# Patient Record
Sex: FEMALE | Race: WHITE | NOT HISPANIC OR LATINO | Employment: FULL TIME | URBAN - METROPOLITAN AREA
[De-identification: names, ages, dates, MRNs, and addresses within clinical notes are randomized per-mention and may not be internally consistent; named-entity substitution may affect disease eponyms.]

---

## 2020-08-04 PROBLEM — D25.9 FIBROID, UTERINE: Status: ACTIVE | Noted: 2020-08-04

## 2021-06-23 ENCOUNTER — EMPLOYEE HEALTH (OUTPATIENT)
Dept: OCCUPATIONAL MEDICINE | Facility: CLINIC | Age: 51
End: 2021-06-23

## 2021-06-23 ENCOUNTER — HOSPITAL ENCOUNTER (OUTPATIENT)
Facility: MEDICAL CENTER | Age: 51
End: 2021-06-23
Attending: NURSE PRACTITIONER
Payer: COMMERCIAL

## 2021-06-23 ENCOUNTER — EH NON-PROVIDER (OUTPATIENT)
Dept: OCCUPATIONAL MEDICINE | Facility: CLINIC | Age: 51
End: 2021-06-23

## 2021-06-23 VITALS
SYSTOLIC BLOOD PRESSURE: 124 MMHG | RESPIRATION RATE: 16 BRPM | BODY MASS INDEX: 26.5 KG/M2 | HEIGHT: 60 IN | DIASTOLIC BLOOD PRESSURE: 62 MMHG | WEIGHT: 135 LBS | OXYGEN SATURATION: 100 % | HEART RATE: 90 BPM

## 2021-06-23 DIAGNOSIS — Z02.89 ENCOUNTER FOR OCCUPATIONAL HEALTH ASSESSMENT: Primary | ICD-10-CM

## 2021-06-23 DIAGNOSIS — Z02.1 PRE-EMPLOYMENT HEALTH SCREENING EXAMINATION: ICD-10-CM

## 2021-06-23 DIAGNOSIS — Z02.89 ENCOUNTER FOR OCCUPATIONAL HEALTH ASSESSMENT: ICD-10-CM

## 2021-06-23 LAB — VZV IGG SER IA-ACNC: 2.12

## 2021-06-23 PROCEDURE — 86762 RUBELLA ANTIBODY: CPT | Performed by: NURSE PRACTITIONER

## 2021-06-23 PROCEDURE — 90715 TDAP VACCINE 7 YRS/> IM: CPT | Performed by: NURSE PRACTITIONER

## 2021-06-23 PROCEDURE — 90746 HEPB VACCINE 3 DOSE ADULT IM: CPT | Performed by: NURSE PRACTITIONER

## 2021-06-23 PROCEDURE — 86787 VARICELLA-ZOSTER ANTIBODY: CPT | Performed by: NURSE PRACTITIONER

## 2021-06-23 PROCEDURE — 8915 PR COMPREHENSIVE PHYSICAL: Performed by: NURSE PRACTITIONER

## 2021-06-23 PROCEDURE — 94010 BREATHING CAPACITY TEST: CPT | Performed by: NURSE PRACTITIONER

## 2021-06-23 PROCEDURE — 86765 RUBEOLA ANTIBODY: CPT | Performed by: NURSE PRACTITIONER

## 2021-06-23 PROCEDURE — 94375 RESPIRATORY FLOW VOLUME LOOP: CPT | Performed by: NURSE PRACTITIONER

## 2021-06-23 PROCEDURE — 86735 MUMPS ANTIBODY: CPT | Performed by: NURSE PRACTITIONER

## 2021-06-23 PROCEDURE — 86480 TB TEST CELL IMMUN MEASURE: CPT | Performed by: NURSE PRACTITIONER

## 2021-06-24 LAB
MEV IGG SER IA-ACNC: 0.76
MUV IGG SER IA-ACNC: 3.07
RUBV AB SER QL: 96.7 IU/ML

## 2021-06-25 LAB
GAMMA INTERFERON BACKGROUND BLD IA-ACNC: 0.04 IU/ML
M TB IFN-G BLD-IMP: NEGATIVE
M TB IFN-G CD4+ BCKGRND COR BLD-ACNC: -0.02 IU/ML
MITOGEN IGNF BCKGRD COR BLD-ACNC: 7.14 IU/ML
QFT TB2 - NIL TBQ2: -0.02 IU/ML

## 2021-08-25 ENCOUNTER — NON-PROVIDER VISIT (OUTPATIENT)
Dept: OCCUPATIONAL MEDICINE | Facility: CLINIC | Age: 51
End: 2021-08-25

## 2021-08-25 DIAGNOSIS — Z23 NEED FOR VACCINATION: ICD-10-CM

## 2021-08-25 PROCEDURE — 90746 HEPB VACCINE 3 DOSE ADULT IM: CPT | Performed by: NURSE PRACTITIONER

## 2021-09-13 ENCOUNTER — EH NON-PROVIDER (OUTPATIENT)
Dept: OCCUPATIONAL MEDICINE | Facility: CLINIC | Age: 51
End: 2021-09-13

## 2021-09-13 DIAGNOSIS — Z71.85 IMMUNIZATION COUNSELING: ICD-10-CM

## 2021-12-24 PROBLEM — F41.9 ANXIETY: Status: ACTIVE | Noted: 2021-12-24

## 2021-12-24 PROBLEM — E11.10 DKA, TYPE 2, NOT AT GOAL (HCC): Status: ACTIVE | Noted: 2021-12-24

## 2021-12-24 PROBLEM — Z79.4 TYPE 2 DIABETES MELLITUS WITH KETOACIDOSIS, WITH LONG-TERM CURRENT USE OF INSULIN (HCC): Status: ACTIVE | Noted: 2021-12-24

## 2021-12-24 PROBLEM — J45.30 MILD PERSISTENT ASTHMA WITHOUT COMPLICATION: Status: ACTIVE | Noted: 2021-12-24

## 2021-12-24 PROBLEM — E11.10 TYPE 2 DIABETES MELLITUS WITH KETOACIDOSIS, WITH LONG-TERM CURRENT USE OF INSULIN (HCC): Status: ACTIVE | Noted: 2021-12-24

## 2021-12-24 PROBLEM — F17.200 TOBACCO USE DISORDER: Status: ACTIVE | Noted: 2021-12-24

## 2022-01-03 ENCOUNTER — PATIENT OUTREACH (OUTPATIENT)
Dept: HEALTH INFORMATION MANAGEMENT | Facility: OTHER | Age: 52
End: 2022-01-03

## 2022-01-03 NOTE — LETTER
1/5/2022    Rebeca Kumar  [address] [address]  [place] [address]      Dear Lina Jose, my name is Dyana Gallegos R.N.. As your [facility] , I want to work with you to help improve your health and wellness, and address any health care needs you may have.    Unfortunately, I have been unable to reach you by phone.    Please call me at Dept: [phone]. If you are hearing impaired, please dial [phone].    I look forward to talking with you.    Sincerely,  Dyana Gallegos R.N.

## 2022-01-08 PROBLEM — E11.10 DKA, TYPE 2, NOT AT GOAL (HCC): Status: RESOLVED | Noted: 2021-12-24 | Resolved: 2022-01-08

## 2022-08-24 ENCOUNTER — OFFICE VISIT (OUTPATIENT)
Dept: URGENT CARE | Facility: CLINIC | Age: 52
End: 2022-08-24
Payer: COMMERCIAL

## 2022-08-24 VITALS
TEMPERATURE: 98.1 F | BODY MASS INDEX: 27.62 KG/M2 | DIASTOLIC BLOOD PRESSURE: 88 MMHG | HEIGHT: 60 IN | WEIGHT: 140.7 LBS | SYSTOLIC BLOOD PRESSURE: 124 MMHG | RESPIRATION RATE: 14 BRPM | OXYGEN SATURATION: 95 %

## 2022-08-24 DIAGNOSIS — M54.41 ACUTE RIGHT-SIDED BACK PAIN WITH SCIATICA: ICD-10-CM

## 2022-08-24 DIAGNOSIS — M62.830 BACK MUSCLE SPASM: ICD-10-CM

## 2022-08-24 PROCEDURE — 99203 OFFICE O/P NEW LOW 30 MIN: CPT | Performed by: NURSE PRACTITIONER

## 2022-08-24 RX ORDER — METHYLPREDNISOLONE 4 MG/1
TABLET ORAL
Qty: 21 TABLET | Refills: 0 | Status: SHIPPED | OUTPATIENT
Start: 2022-08-24 | End: 2022-08-26

## 2022-08-24 RX ORDER — CYCLOBENZAPRINE HCL 5 MG
5-10 TABLET ORAL 3 TIMES DAILY PRN
Qty: 30 TABLET | Refills: 0 | Status: SHIPPED | OUTPATIENT
Start: 2022-08-24 | End: 2023-07-07

## 2022-08-24 RX ORDER — DEXAMETHASONE SODIUM PHOSPHATE 4 MG/ML
4 INJECTION, SOLUTION INTRA-ARTICULAR; INTRALESIONAL; INTRAMUSCULAR; INTRAVENOUS; SOFT TISSUE ONCE
Status: COMPLETED | OUTPATIENT
Start: 2022-08-24 | End: 2022-08-24

## 2022-08-24 RX ADMIN — DEXAMETHASONE SODIUM PHOSPHATE 4 MG: 4 INJECTION, SOLUTION INTRA-ARTICULAR; INTRALESIONAL; INTRAMUSCULAR; INTRAVENOUS; SOFT TISSUE at 13:21

## 2022-08-24 ASSESSMENT — ENCOUNTER SYMPTOMS
ORTHOPNEA: 0
TINGLING: 0
NAUSEA: 0
SENSORY CHANGE: 0
FEVER: 0
VOMITING: 0
FLANK PAIN: 0
BACK PAIN: 1
CHILLS: 0
HEADACHES: 0
MYALGIAS: 1
CONSTIPATION: 0
PALPITATIONS: 0
FOCAL WEAKNESS: 0
ABDOMINAL PAIN: 0
DIARRHEA: 0
DIZZINESS: 0
FALLS: 0
WEAKNESS: 0
SHORTNESS OF BREATH: 0

## 2022-08-24 ASSESSMENT — FIBROSIS 4 INDEX: FIB4 SCORE: 0.89

## 2022-08-24 NOTE — LETTER
August 24, 2022       Patient: Rebeca Kumar   YOB: 1970   Date of Visit: 8/24/2022         To Whom It May Concern:    In my medical opinion, I recommend that Rebeca Kumar be excused from work tonight as well as tomorrow (8/25/2022) due to non-contagious illness.     If you have any questions or concerns, please don't hesitate to call [phone]          Sincerely,          WILLY PimentelRIsabellaN.  Electronically Signed

## 2022-08-24 NOTE — PROGRESS NOTES
Subjective     Rebeca Kumar is a 51 y.o. female who presents with Back Pain (8x days, sciatic nerve, R side, shooting pain, throbbing R knee, not able to sit )            HPI  States she just returned from a road trip to [place] approximately 8 days ago.  Experiencing right-sided low back pain with pain radiating into right buttock and into knee.  Has tried over-the-counter NSAIDs and Tylenol, heat ice, massage and stretching but has not helped.  States pain is not allowing her to sleep.  [employer] works as registered nurse on night shift.  States standing helps but sitting and laying down increases pain.    PMH:  has a past medical history of Asthma, Diabetes ([facility]), and DKA, type 2, not at goal ([facility]) (12/24/2021).  MEDS:   Current Outpatient Medications:     methylPREDNISolone (MEDROL DOSEPAK) 4 MG Tablet Therapy Pack, Follow schedule on package instructions., Disp: 21 Tablet, Rfl: 0    cyclobenzaprine (FLEXERIL) 5 mg tablet, Take 1-2 Tablets by mouth 3 times a day as needed for Muscle Spasms. Causes drowsiness, do not drive or work while using. Caution with use with other sedating medications., Disp: 30 Tablet, Rfl: 0    WIXELA INHUB 250-50 MCG/ACT AEROSOL POWDER, BREATH ACTIVATED, TAKE 1 PUFF BY MOUTH EVERY 12 HOURS, Disp: 60 Each, Rfl: 6    TRESIBA FLEXTOUCH 100 UNIT/ML Solution Pen-injector, INJECT 15 UNITS UNDER THE SKIN EVERY EVENING., Disp: 15 mL, Rfl: 6    albuterol 108 (90 Base) MCG/ACT Aero Soln inhalation aerosol, INHALE TWO (2) PUFFS BY MOUTH FOUR (4) TIMES DAILY AS NEEDED, Disp: 8.5 g, Rfl: 3    albuterol (PROVENTIL) 2.5mg/3ml Nebu Soln solution for nebulization, Take 3 mL by nebulization every four hours as needed for Shortness of Breath., Disp: 100 Each, Rfl: 6    buPROPion (WELLBUTRIN XL) 150 MG XL tablet, Take 1 Tablet by mouth every day., Disp: 30 Tablet, Rfl: 3    insulin lispro (HUMALOG KWIKPEN) 100 UNIT/ML Solution Pen-injector injection PEN, Inject 1-10 Units under the skin 3 times a day  "before meals. Unit for every 50 points over 150 and 1 unit per 15 g of carbohydrates consumed.  Maximum 40 units in 24 hours, Disp: 5 Each, Rfl: 3    ipratropium-albuterol (COMBIVENT RESPIMAT)  MCG/ACT Aero Soln, Inhale 1 Puff 4 times a day., Disp: 1 Each, Rfl: 3    ondansetron (ZOFRAN) 4 MG Tab tablet, TAKE ONE (1) TABLET BY MOUTH EVERY SIX (6) HOURS AS NEEDED FOR NAUSEA, Disp: , Rfl:     acetaminophen (TYLENOL) 325 MG Tab, Take 1 Tab by mouth every four hours as needed., Disp: 30 Tab, Rfl: 0    fluconazole (DIFLUCAN) 100 MG Tab, Take 2 the first day then 1 daily for 10 days (Patient not taking: Reported on 8/24/2022), Disp: 11 Tablet, Rfl: 0    Current Facility-Administered Medications:     dexamethasone (DECADRON) injection 4 mg, 4 mg, Intramuscular, Once, Bri Olsen, A.P.R.N.  ALLERGIES:   Allergies   Allergen Reactions    Eggs Hives, Itching and Swelling     Itchy eyes, throat and face swells up     Fish Allergy Rash and Swelling     Rash    Morphine      \"During surgery morphine put me into a heart block'     SURGHX: History reviewed. No pertinent surgical history.  SOCHX:  reports that she has never smoked. She has never used smokeless tobacco. She reports that she does not currently use alcohol. She reports that she does not use drugs.  FH: Family history was reviewed, no pertinent findings to report    Review of Systems   Constitutional:  Negative for chills, fever and malaise/fatigue.   Respiratory:  Negative for shortness of breath.    Cardiovascular:  Negative for chest pain, palpitations, orthopnea and leg swelling.   Gastrointestinal:  Negative for abdominal pain, constipation, diarrhea, nausea and vomiting.   Genitourinary:  Negative for dysuria, flank pain, frequency, hematuria and urgency.   Musculoskeletal:  Positive for back pain and myalgias. Negative for falls and joint pain.   Skin:  Negative for itching and rash.   Neurological:  Negative for dizziness, tingling, sensory change, " focal weakness, weakness and headaches.   All other systems reviewed and are negative.           Objective     /88 (BP Location: Right arm, Patient Position: Sitting, BP Cuff Size: Adult)   Temp 36.7 °C (98.1 °F) (Temporal)   Resp 14   Ht 1.524 m (5')   Wt 63.8 kg (140 lb 11.2 oz)   SpO2 95%   BMI 27.48 kg/m²      Physical Exam  Vitals reviewed.   Constitutional:       General: She is awake. She is not in acute distress.     Appearance: Normal appearance. She is well-developed. She is not ill-appearing, toxic-appearing or diaphoretic.   HENT:      Head: Normocephalic.   Cardiovascular:      Rate and Rhythm: Normal rate.   Pulmonary:      Effort: Pulmonary effort is normal.   Musculoskeletal:      Lumbar back: Spasms and tenderness present. No swelling, edema, deformity, signs of trauma, lacerations or bony tenderness. Decreased range of motion. Negative right straight leg raise test and negative left straight leg raise test. No scoliosis.      Comments: Tenderness on palpation of right sided low back muscle into right buttock.    Skin:     General: Skin is warm and dry.      Findings: No abrasion, bruising, ecchymosis, erythema, signs of injury, rash or wound.   Neurological:      Mental Status: She is alert and oriented to person, place, and time.      Sensory: Sensation is intact.      Motor: Motor function is intact.      Coordination: Coordination is intact.      Gait: Gait is intact.   Psychiatric:         Attention and Perception: Attention normal.         Mood and Affect: Mood normal.         Speech: Speech normal.         Behavior: Behavior normal. Behavior is cooperative.                           Assessment & Plan        1. Acute right-sided back pain with sciatica    - dexamethasone (DECADRON) injection 4 mg  - methylPREDNISolone (MEDROL DOSEPAK) 4 MG Tablet Therapy Pack; Follow schedule on package instructions.  Dispense: 21 Tablet; Refill: 0    2. Back muscle spasm    - cyclobenzaprine  (FLEXERIL) 5 mg tablet; Take 1-2 Tablets by mouth 3 times a day as needed for Muscle Spasms. Causes drowsiness, do not drive or work while using. Caution with use with other sedating medications.  Dispense: 30 Tablet; Refill: 0     -Take over the counter NSAID as needed for back discomfort, avoid any Ibuprofen products with oral steroid use, may use Tylenol as needed for additional pain relief  -May use cool compresses for any swelling /throbbing pain and warm compresses for muscle stiffness   -May perform gentle back muscle stretches as tolerated after warm compresses to maintain mobility, avoid abdominal crunches, heavy lifting or repetitive motions  -May use Flexeril as directed when at home only due to drowsiness  -May apply topical analgesics as needed (preferred lidocaine based topical like Salon Pas)  -Perform proper body mechanics with lifting, twisting, bending and walking. Ask for assistance with heavy objects as needed   -Monitor for bowel/urination problems, numbness/tingling in extremities, decreased range of armando with ambulation difficulty- need re-evaluation

## 2022-08-26 ENCOUNTER — OFFICE VISIT (OUTPATIENT)
Dept: MEDICAL GROUP | Facility: PHYSICIAN GROUP | Age: 52
End: 2022-08-26
Payer: COMMERCIAL

## 2022-08-26 ENCOUNTER — TELEPHONE (OUTPATIENT)
Dept: SCHEDULING | Facility: IMAGING CENTER | Age: 52
End: 2022-08-26

## 2022-08-26 VITALS
DIASTOLIC BLOOD PRESSURE: 62 MMHG | HEART RATE: 100 BPM | BODY MASS INDEX: 27.96 KG/M2 | HEIGHT: 60 IN | TEMPERATURE: 98.2 F | RESPIRATION RATE: 16 BRPM | WEIGHT: 142.42 LBS | SYSTOLIC BLOOD PRESSURE: 126 MMHG | OXYGEN SATURATION: 97 %

## 2022-08-26 DIAGNOSIS — Z12.31 ENCOUNTER FOR SCREENING MAMMOGRAM FOR BREAST CANCER: ICD-10-CM

## 2022-08-26 DIAGNOSIS — J45.30 MILD PERSISTENT ASTHMA WITHOUT COMPLICATION: ICD-10-CM

## 2022-08-26 DIAGNOSIS — Z79.4: ICD-10-CM

## 2022-08-26 DIAGNOSIS — Z91.89 AT RISK FOR BREAST CANCER: ICD-10-CM

## 2022-08-26 DIAGNOSIS — Z23 NEED FOR VACCINATION: ICD-10-CM

## 2022-08-26 DIAGNOSIS — F41.9 ANXIETY: ICD-10-CM

## 2022-08-26 DIAGNOSIS — E11.10: ICD-10-CM

## 2022-08-26 DIAGNOSIS — M54.31 SCIATICA OF RIGHT SIDE: ICD-10-CM

## 2022-08-26 DIAGNOSIS — Z80.41 FAMILY HISTORY OF OVARIAN CANCER: ICD-10-CM

## 2022-08-26 DIAGNOSIS — Z12.11 COLON CANCER SCREENING: ICD-10-CM

## 2022-08-26 PROCEDURE — 99213 OFFICE O/P EST LOW 20 MIN: CPT | Performed by: STUDENT IN AN ORGANIZED HEALTH CARE EDUCATION/TRAINING PROGRAM

## 2022-08-26 RX ORDER — TRAMADOL HYDROCHLORIDE 50 MG/1
50 TABLET ORAL EVERY 12 HOURS PRN
Qty: 14 TABLET | Refills: 0 | Status: SHIPPED | OUTPATIENT
Start: 2022-08-26 | End: 2022-09-02

## 2022-08-26 RX ORDER — NAPROXEN 250 MG/1
250 TABLET ORAL 2 TIMES DAILY WITH MEALS
COMMUNITY

## 2022-08-26 RX ORDER — TRAMADOL HYDROCHLORIDE 50 MG/1
50 TABLET ORAL EVERY 8 HOURS PRN
Qty: 21 TABLET | Refills: 0 | Status: SHIPPED | OUTPATIENT
Start: 2022-08-26 | End: 2022-08-26

## 2022-08-26 RX ORDER — TRIAMCINOLONE ACETONIDE 1 MG/G
0.1 CREAM TOPICAL
COMMUNITY

## 2022-08-26 ASSESSMENT — PATIENT HEALTH QUESTIONNAIRE - PHQ9: CLINICAL INTERPRETATION OF PHQ2 SCORE: 0

## 2022-08-26 ASSESSMENT — FIBROSIS 4 INDEX: FIB4 SCORE: 0.89

## 2022-08-26 NOTE — LETTER
August 26, 2022    To Whom It May Concern:         This is confirmation that Rebeca Kumar attended her scheduled appointment with Ab Horton D.O. on 8/26/22.         If you have any questions please do not hesitate to call me at the phone number listed below.    Sincerely,    Ab Horton D.O.  [phone]

## 2022-08-26 NOTE — ASSESSMENT & PLAN NOTE
Acute sciatica not improving with basic management of muscle relaxer, tylenol, naproxen and stretching. Will give her 7 days of tramadol and advise her not to work or drive while on this medication. Referral for PT. If not improving will send to PMR>

## 2022-08-26 NOTE — ASSESSMENT & PLAN NOTE
Last a1c 10. Dr. Brandt.   Sees ophto for retinal screenings, will request records  Managed by endocrinology

## 2022-08-26 NOTE — PROGRESS NOTES
Subjective:   HISTORY OF THE PRESENT ILLNESS: Patient is a 51 y.o. female here today to establish care.     Problem   Family History of Ovarian Cancer   Sciatica of Right Side    sciata R side chronic but now exacerbated about 8 ago. Went to urgent care got steroids and muscle relaxer. Reports not helping. Having N/T down her leg and throbbing in her R knee. Using heat pads, muscle relaxer, naproxen and tylenol and still not helping.          Anxiety   Type 2 Diabetes Mellitus With Ketoacidosis, With Long-Term Current Use of Insulin ([facility])    Managed by endocrinology     Mild Persistent Asthma Without Complication        Current Outpatient Medications Ordered in Epic   Medication Sig Dispense Refill    triamcinolone acetonide (KENALOG) 0.1 % Cream Apply 0.1 g topically.      naproxen (NAPROSYN) 250 MG Tab Take 250 mg by mouth 2 times a day with meals.      traMADol (ULTRAM) 50 MG Tab Take 1 Tablet by mouth every 12 hours as needed for Moderate Pain for up to 7 days. 14 Tablet 0    cyclobenzaprine (FLEXERIL) 5 mg tablet Take 1-2 Tablets by mouth 3 times a day as needed for Muscle Spasms. Causes drowsiness, do not drive or work while using. Caution with use with other sedating medications. 30 Tablet 0    WIXELA INHUB 250-50 MCG/ACT AEROSOL POWDER, BREATH ACTIVATED TAKE 1 PUFF BY MOUTH EVERY 12 HOURS 60 Each 6    TRESIBA FLEXTOUCH 100 UNIT/ML Solution Pen-injector INJECT 15 UNITS UNDER THE SKIN EVERY EVENING. 15 mL 6    albuterol 108 (90 Base) MCG/ACT Aero Soln inhalation aerosol INHALE TWO (2) PUFFS BY MOUTH FOUR (4) TIMES DAILY AS NEEDED 8.5 g 3    albuterol (PROVENTIL) 2.5mg/3ml Nebu Soln solution for nebulization Take 3 mL by nebulization every four hours as needed for Shortness of Breath. 100 Each 6    buPROPion (WELLBUTRIN XL) 150 MG XL tablet Take 1 Tablet by mouth every day. 30 Tablet 3    insulin lispro (HUMALOG KWIKPEN) 100 UNIT/ML Solution Pen-injector injection PEN Inject 1-10 Units under the skin 3 times a  day before meals. Unit for every 50 points over 150 and 1 unit per 15 g of carbohydrates consumed.  Maximum 40 units in 24 hours 5 Each 3    ondansetron (ZOFRAN) 4 MG Tab tablet TAKE ONE (1) TABLET BY MOUTH EVERY SIX (6) HOURS AS NEEDED FOR NAUSEA      acetaminophen (TYLENOL) 325 MG Tab Take 1 Tab by mouth every four hours as needed. 30 Tab 0    ipratropium-albuterol (COMBIVENT RESPIMAT)  MCG/ACT Aero Soln Inhale 1 Puff 4 times a day. (Patient not taking: Reported on 8/26/2022) 1 Each 3     No current Epic-ordered facility-administered medications on file.       Review of systems:  Per HPI    Past Medical History:   Diagnosis Date    Asthma     Diabetes ([facility])     DKA, type 2, not at goal ([facility]) 12/24/2021     History reviewed. No pertinent surgical history.  Social History     Tobacco Use    Smoking status: Former     Types: Cigarettes    Smokeless tobacco: Never   Vaping Use    Vaping Use: Never used   Substance Use Topics    Alcohol use: Not Currently     Comment: occ    Drug use: Never      History reviewed. No pertinent family history.  Current Outpatient Medications   Medication Sig Dispense Refill    triamcinolone acetonide (KENALOG) 0.1 % Cream Apply 0.1 g topically.      naproxen (NAPROSYN) 250 MG Tab Take 250 mg by mouth 2 times a day with meals.      traMADol (ULTRAM) 50 MG Tab Take 1 Tablet by mouth every 12 hours as needed for Moderate Pain for up to 7 days. 14 Tablet 0    cyclobenzaprine (FLEXERIL) 5 mg tablet Take 1-2 Tablets by mouth 3 times a day as needed for Muscle Spasms. Causes drowsiness, do not drive or work while using. Caution with use with other sedating medications. 30 Tablet 0    WIXELA INHUB 250-50 MCG/ACT AEROSOL POWDER, BREATH ACTIVATED TAKE 1 PUFF BY MOUTH EVERY 12 HOURS 60 Each 6    TRESIBA FLEXTOUCH 100 UNIT/ML Solution Pen-injector INJECT 15 UNITS UNDER THE SKIN EVERY EVENING. 15 mL 6    albuterol 108 (90 Base) MCG/ACT Aero Soln inhalation aerosol INHALE TWO (2) PUFFS BY MOUTH  "FOUR (4) TIMES DAILY AS NEEDED 8.5 g 3    albuterol (PROVENTIL) 2.5mg/3ml Nebu Soln solution for nebulization Take 3 mL by nebulization every four hours as needed for Shortness of Breath. 100 Each 6    buPROPion (WELLBUTRIN XL) 150 MG XL tablet Take 1 Tablet by mouth every day. 30 Tablet 3    insulin lispro (HUMALOG KWIKPEN) 100 UNIT/ML Solution Pen-injector injection PEN Inject 1-10 Units under the skin 3 times a day before meals. Unit for every 50 points over 150 and 1 unit per 15 g of carbohydrates consumed.  Maximum 40 units in 24 hours 5 Each 3    ondansetron (ZOFRAN) 4 MG Tab tablet TAKE ONE (1) TABLET BY MOUTH EVERY SIX (6) HOURS AS NEEDED FOR NAUSEA      acetaminophen (TYLENOL) 325 MG Tab Take 1 Tab by mouth every four hours as needed. 30 Tab 0    ipratropium-albuterol (COMBIVENT RESPIMAT)  MCG/ACT Aero Soln Inhale 1 Puff 4 times a day. (Patient not taking: Reported on 8/26/2022) 1 Each 3     No current facility-administered medications for this visit.       Allergies:  Allergies   Allergen Reactions    Albumin Hives, Itching and Swelling     Itchy eyes, throat and face swells up     Eggs Hives, Itching and Swelling     Itchy eyes, throat and face swells up     Fish Allergy Rash and Swelling     Rash    Morphine      \"During surgery morphine put me into a heart block'       Allergies, past medical history, past surgical history, family history, social history reviewed and updated.    Objective:    /62   Pulse 100   Temp 36.8 °C (98.2 °F) (Temporal)   Resp 16   Ht 1.524 m (5')   Wt 64.6 kg (142 lb 6.7 oz)   SpO2 97%   BMI 27.81 kg/m²    Body mass index is 27.81 kg/m².    Physical exam:  General: Normal appearance, no acute distress, not ill-appearing  HEENT: EOM intact, conjunctiva normal limits, negative right/left eye discharge.  Sclera anicteric  Cardiovascular: Normal rate and rhythm, no murmurs  Pulmonary: No respiratory distress, no wheezing, no rales, breath sounds normal.  Abdomen: " Bowel sounds normal, flat, soft.  Musculoskeletal: No edema bilaterally  Skin: Warm, dry, no lesions  Neurological: No focal deficits, normal gait  Psychiatric: Mood within normal limits    Assessment/Plan:    Patient here for a preventive medicine visit today and to establish care.  -Reviewed all past medical history, family history, social history    -Diet and exercise appropriate counseling given  -Social determinants of health reviewed  -Tobacco, alcohol, recreational drug use: reviewed no concerns  -Occupation: RN    Immunizations  Immunizations: declines hep B , pna today    Cancer screenings:  Colorectal cancer screening: no fam hx of colon cancer. Cologuard ordered  Cervical Cancer Screening: last one was about 1 year ago. Normal   Breast Cancer Screening: last mammogram was 2 years ago.          ----BRCA SCREENING: FHS-7 score: mother had ovarian cancer.       Ob-Gyn/ History:   Patient has GYN provider: none    Hx of abnormal Pap smears: none  Gyn Surgery: No  Current Contraceptive Method: none  Last menstrual period: perimenopausal      Problem List Items Addressed This Visit       Anxiety     Stable, cont current managment         Type 2 diabetes mellitus with ketoacidosis, with long-term current use of insulin (HCC)     Last a1c 10. Dr. Brandt.   Sees ophto for retinal screenings, will request records  Managed by endocrinology         Mild persistent asthma without complication     Stable. Cont current regimen         Family history of ovarian cancer     Genetic testing ordered         Relevant Orders    Referral to Genetic Research Studies    Sciatica of right side     Acute sciatica not improving with basic management of muscle relaxer, tylenol, naproxen and stretching. Will give her 7 days of tramadol and advise her not to work or drive while on this medication. Referral for PT. If not improving will send to PMR>          Relevant Medications    traMADol (ULTRAM) 50 MG Tab    Other Relevant Orders     Referral to Physical Therapy     Other Visit Diagnoses       Need for vaccination        Relevant Orders    Pneumococcal Conjugate Vaccine 20-Valent (19 yrs+)    At risk for breast cancer        Encounter for screening mammogram for breast cancer        Relevant Orders    MA-SCREENING MAMMO BILAT W/TOMOSYNTHESIS W/CAD    Colon cancer screening        Relevant Orders    COLOGUARD (FIT DNA)             Return in about 1 year (around 8/26/2023).

## 2023-07-07 ENCOUNTER — OFFICE VISIT (OUTPATIENT)
Dept: MEDICAL GROUP | Facility: PHYSICIAN GROUP | Age: 53
End: 2023-07-07
Payer: COMMERCIAL

## 2023-07-07 VITALS
BODY MASS INDEX: 27.27 KG/M2 | RESPIRATION RATE: 16 BRPM | SYSTOLIC BLOOD PRESSURE: 122 MMHG | HEIGHT: 60 IN | WEIGHT: 138.89 LBS | OXYGEN SATURATION: 98 % | HEART RATE: 89 BPM | TEMPERATURE: 96.9 F | DIASTOLIC BLOOD PRESSURE: 78 MMHG

## 2023-07-07 DIAGNOSIS — Z79.4 TYPE 2 DIABETES MELLITUS WITHOUT COMPLICATION, WITH LONG-TERM CURRENT USE OF INSULIN (HCC): ICD-10-CM

## 2023-07-07 DIAGNOSIS — M65.332 TRIGGER MIDDLE FINGER OF LEFT HAND: ICD-10-CM

## 2023-07-07 DIAGNOSIS — Z12.31 ENCOUNTER FOR SCREENING MAMMOGRAM FOR BREAST CANCER: ICD-10-CM

## 2023-07-07 DIAGNOSIS — Z11.59 NEED FOR HEPATITIS C SCREENING TEST: ICD-10-CM

## 2023-07-07 DIAGNOSIS — J45.30 MILD PERSISTENT ASTHMA WITHOUT COMPLICATION: ICD-10-CM

## 2023-07-07 DIAGNOSIS — E11.9 TYPE 2 DIABETES MELLITUS WITHOUT COMPLICATION, WITH LONG-TERM CURRENT USE OF INSULIN (HCC): ICD-10-CM

## 2023-07-07 DIAGNOSIS — J30.1 SEASONAL ALLERGIC RHINITIS DUE TO POLLEN: ICD-10-CM

## 2023-07-07 PROCEDURE — 99214 OFFICE O/P EST MOD 30 MIN: CPT | Performed by: STUDENT IN AN ORGANIZED HEALTH CARE EDUCATION/TRAINING PROGRAM

## 2023-07-07 PROCEDURE — 3078F DIAST BP <80 MM HG: CPT | Performed by: STUDENT IN AN ORGANIZED HEALTH CARE EDUCATION/TRAINING PROGRAM

## 2023-07-07 PROCEDURE — 3074F SYST BP LT 130 MM HG: CPT | Performed by: STUDENT IN AN ORGANIZED HEALTH CARE EDUCATION/TRAINING PROGRAM

## 2023-07-07 RX ORDER — ONDANSETRON 4 MG/1
4 TABLET, ORALLY DISINTEGRATING ORAL 3 TIMES DAILY PRN
COMMUNITY
Start: 2023-05-23 | End: 2023-07-07

## 2023-07-07 RX ORDER — MONTELUKAST SODIUM 10 MG/1
10 TABLET ORAL DAILY
Qty: 30 TABLET | Refills: 2 | Status: SHIPPED | OUTPATIENT
Start: 2023-07-07 | End: 2023-07-31

## 2023-07-07 RX ORDER — KETOTIFEN FUMARATE 0.25 MG/ML
1 SOLUTION/ DROPS OPHTHALMIC 2 TIMES DAILY
Qty: 10 ML | Refills: 1 | Status: SHIPPED | OUTPATIENT
Start: 2023-07-07 | End: 2024-03-12

## 2023-07-07 ASSESSMENT — FIBROSIS 4 INDEX: FIB4 SCORE: 0.91

## 2023-07-07 ASSESSMENT — PATIENT HEALTH QUESTIONNAIRE - PHQ9: CLINICAL INTERPRETATION OF PHQ2 SCORE: 0

## 2023-07-07 NOTE — LETTER
[facility]  Ab Horton D.O.  [address]  [place] [address]  Fax: [phone]   Authorization for Release/Disclosure of   Protected Health Information   Name: REBECA DEGROOT : 1970 SSN: [id number]   Address: [address] [address]  [place] [address] Phone:    [phone] (home)    I authorize the entity listed below to release/disclose the PHI below to:   [facility]/Ab Horton D.O. and Ab Horton D.O.   Provider or Entity Name:  [facility]   Address   City, State, [facility]   Phone:      Fax:     Reason for request: continuity of care   Information to be released:    [  ] LAST COLONOSCOPY,  including any PATH REPORT and follow-up  [  ] LAST FIT/COLOGUARD RESULT [  ] LAST DEXA  [  ] LAST MAMMOGRAM  [ x ] LAST PAP  [  ] LAST LABS [  ] RETINA EXAM REPORT  [  ] IMMUNIZATION RECORDS  [  ] Release all info      [  ] Check here and initial the line next to each item to release ALL health information INCLUDING  _____ Care and treatment for drug and / or alcohol abuse  _____ HIV testing, infection status, or AIDS  _____ Genetic Testing    DATES OF SERVICE OR TIME PERIOD TO BE DISCLOSED: _____________  I understand and acknowledge that:  * This Authorization may be revoked at any time by you in writing, except if your health information has already been used or disclosed.  * Your health information that will be used or disclosed as a result of you signing this authorization could be re-disclosed by the recipient. If this occurs, your re-disclosed health information may no longer be protected by State or Federal laws.  * You may refuse to sign this Authorization. Your refusal will not affect your ability to obtain treatment.  * This Authorization becomes effective upon signing and will  on (date) __________.      If no date is indicated, this Authorization will  one (1) year from the signature date.    Name: Rebeca Degroot  Signature: continuity of care Date:   2023      PLEASE FAX REQUESTED RECORDS BACK TO: [phone]

## 2023-07-07 NOTE — LETTER
[facility]  Ab Horton D.O.  [address]  [address]  [place] [address]  Fax: [phone]   Authorization for Release/Disclosure of   Protected Health Information   Name: REBECA DEGROOT : 1970 SSN: [id number]   Address: [address] [address]  [place] [address] Phone:    [phone] (home)    I authorize the entity listed below to release/disclose the PHI below to:   [facility]/Ab Horton D.O. and Ab Horton D.O.   Provider or Entity Name:  Iris Brandt   Address   City, State, [facility]   Phone:      Fax:     Reason for request: continuity of care   Information to be released:    [  ] LAST COLONOSCOPY,  including any PATH REPORT and follow-up  [  ] LAST FIT/COLOGUARD RESULT [  ] LAST DEXA  [  ] LAST MAMMOGRAM  [  ] LAST PAP  [  ] LAST LABS [  ] RETINA EXAM REPORT  [  ] IMMUNIZATION RECORDS  [x  ] Release all info      [  ] Check here and initial the line next to each item to release ALL health information INCLUDING  _____ Care and treatment for drug and / or alcohol abuse  _____ HIV testing, infection status, or AIDS  _____ Genetic Testing    DATES OF SERVICE OR TIME PERIOD TO BE DISCLOSED: _____________  I understand and acknowledge that:  * This Authorization may be revoked at any time by you in writing, except if your health information has already been used or disclosed.  * Your health information that will be used or disclosed as a result of you signing this authorization could be re-disclosed by the recipient. If this occurs, your re-disclosed health information may no longer be protected by State or Federal laws.  * You may refuse to sign this Authorization. Your refusal will not affect your ability to obtain treatment.  * This Authorization becomes effective upon signing and will  on (date) __________.      If no date is indicated, this Authorization will  one (1) year from the signature date.    Name: Rebeca Degroot  Signature: continuity of care Date:   2023      PLEASE FAX REQUESTED RECORDS BACK TO: [phone]

## 2023-07-08 NOTE — ASSESSMENT & PLAN NOTE
Lungs are also seen bilaterally, continue albuterol as needed, Wixela inhaler  Add Singulair, advised to use Zyrtec or Claritin not both.  Consider adding Flonase

## 2023-07-08 NOTE — PROGRESS NOTES
HISTORY OF PRESENT ILLNESS: Rebeca is a pleasant 52 y.o. female, established patient who presents today to discuss medical problems as listed below :    Problem   Seasonal Allergic Rhinitis Due to Pollen    Patient has severe allergic rhinitis due to all the pollen in the air.  She is getting very itchy watery eyes and flaring up her asthma giving her some congestion.  She has been using Zyrtec and Claritin with not much benefit.     Trigger Middle Finger of Left Hand   Type 2 Diabetes Mellitus Without Complication, With Long-Term Current Use of Insulin (Hcc)    She reports that this is managed by endocrinology though she has not seen an endocrinologist in over a year now.  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     Mild Persistent Asthma Without Complication    On wixela and albuterol prn.           Current Outpatient Medications Ordered in Epic   Medication Sig Dispense Refill    TRESIBA FLEXTOUCH 100 UNIT/ML Solution Pen-injector INJECT 15 UNITS UNDER THE SKIN EVERY EVENING. 15 mL 0    montelukast (SINGULAIR) 10 MG Tab Take 1 Tablet by mouth every day. 30 Tablet 2    ketotifen (ZADITOR) 0.025 % ophthalmic solution Administer 1 Drop into both eyes 2 times a day. 10 mL 1    albuterol 108 (90 Base) MCG/ACT Aero Soln inhalation aerosol INHALE TWO (2) PUFFS BY MOUTH FOUR (4) TIMES DAILY AS NEEDED 8.5 Each 0    albuterol (PROVENTIL) 2.5mg/3ml Nebu Soln solution for nebulization TAKE 3 ML BY NEBULIZATION EVERY FOUR HOURS AS NEEDED FOR SHORTNESS OF BREATH. 450 mL 1    triamcinolone acetonide (KENALOG) 0.1 % Cream Apply 0.1 g topically.      naproxen (NAPROSYN) 250 MG Tab Take 250 mg by mouth 2 times a day with meals.      WIXELA INHUB 250-50 MCG/ACT AEROSOL POWDER, BREATH ACTIVATED TAKE 1 PUFF BY MOUTH EVERY 12 HOURS 60 Each 6    insulin lispro (HUMALOG KWIKPEN) 100 UNIT/ML Solution Pen-injector injection PEN Inject 1-10 Units under the skin 3 times a day before meals. Unit for every 50 points over 150  and 1 unit per 15 g of carbohydrates consumed.  Maximum 40 units in 24 hours 5 Each 3    ondansetron (ZOFRAN) 4 MG Tab tablet TAKE ONE (1) TABLET BY MOUTH EVERY SIX (6) HOURS AS NEEDED FOR NAUSEA      acetaminophen (TYLENOL) 325 MG Tab Take 1 Tab by mouth every four hours as needed. 30 Tab 0     No current Epic-ordered facility-administered medications on file.       Review of systems:  Per HPI    Past Medical History:   Diagnosis Date    Asthma     Diabetes ([facility])     DKA, type 2, not at goal ([facility]) 12/24/2021     History reviewed. No pertinent surgical history.  Social History     Tobacco Use    Smoking status: Former     Types: Cigarettes    Smokeless tobacco: Never   Vaping Use    Vaping Use: Never used   Substance Use Topics    Alcohol use: Not Currently     Comment: occ    Drug use: Never      History reviewed. No pertinent family history.  Current Outpatient Medications   Medication Sig Dispense Refill    TRESIBA FLEXTOUCH 100 UNIT/ML Solution Pen-injector INJECT 15 UNITS UNDER THE SKIN EVERY EVENING. 15 mL 0    montelukast (SINGULAIR) 10 MG Tab Take 1 Tablet by mouth every day. 30 Tablet 2    ketotifen (ZADITOR) 0.025 % ophthalmic solution Administer 1 Drop into both eyes 2 times a day. 10 mL 1    albuterol 108 (90 Base) MCG/ACT Aero Soln inhalation aerosol INHALE TWO (2) PUFFS BY MOUTH FOUR (4) TIMES DAILY AS NEEDED 8.5 Each 0    albuterol (PROVENTIL) 2.5mg/3ml Nebu Soln solution for nebulization TAKE 3 ML BY NEBULIZATION EVERY FOUR HOURS AS NEEDED FOR SHORTNESS OF BREATH. 450 mL 1    triamcinolone acetonide (KENALOG) 0.1 % Cream Apply 0.1 g topically.      naproxen (NAPROSYN) 250 MG Tab Take 250 mg by mouth 2 times a day with meals.      WIXELA INHUB 250-50 MCG/ACT AEROSOL POWDER, BREATH ACTIVATED TAKE 1 PUFF BY MOUTH EVERY 12 HOURS 60 Each 6    insulin lispro (HUMALOG KWIKPEN) 100 UNIT/ML Solution Pen-injector injection PEN Inject 1-10 Units under the skin 3 times a day before meals. Unit for every 50  "points over 150 and 1 unit per 15 g of carbohydrates consumed.  Maximum 40 units in 24 hours 5 Each 3    ondansetron (ZOFRAN) 4 MG Tab tablet TAKE ONE (1) TABLET BY MOUTH EVERY SIX (6) HOURS AS NEEDED FOR NAUSEA      acetaminophen (TYLENOL) 325 MG Tab Take 1 Tab by mouth every four hours as needed. 30 Tab 0     No current facility-administered medications for this visit.       Allergies:  Allergies   Allergen Reactions    Albumin Hives, Itching and Swelling     Itchy eyes, throat and face swells up     Eggs Hives, Itching and Swelling     Itchy eyes, throat and face swells up     Fish Allergy Rash and Swelling     Rash    Morphine      \"During surgery morphine put me into a heart block'       Allergies, past medical history, past surgical history, family history, social history reviewed and updated.    Objective:    /78 (BP Location: Right arm, Patient Position: Sitting, BP Cuff Size: Adult)   Pulse 89   Temp 36.1 °C (96.9 °F) (Temporal)   Resp 16   Ht 1.524 m (5')   Wt 63 kg (138 lb 14.2 oz)   SpO2 98%   BMI 27.13 kg/m²    Body mass index is 27.13 kg/m².    Physical exam:  General: Normal appearance, no acute distress, not ill-appearing  HEENT: EOM intact, conjunctiva normal limits, negative right/left eye discharge.  Sclera anicteric  Cardiovascular: Normal rate and rhythm, no murmurs  Pulmonary: No respiratory distress, no wheezing, no rales, breath sounds normal.  Musculoskeletal: No edema bilaterally  Skin: Warm, dry, no lesions  Neurological: No focal deficits, normal gait  Psychiatric: Mood within normal limits    Assessment/Plan:    Problem List Items Addressed This Visit       Type 2 diabetes mellitus without complication, with long-term current use of insulin (HCC)     I suggest the patient that I could take over management however she declines and would like to keep seeing her endocrinologist.  Advised her to follow-up at least every 6 months.  Will order baseline labs and advised her to " follow-up    Diabetic foot exam at next visit  She would like to hold off on monofilament exam  Consider retinal exam at next visit as well         Relevant Orders    HEMOGLOBIN A1C    LIPID PANEL    CBC WITHOUT DIFFERENTIAL    Comp Metabolic Panel    MICROALBUMIN CREAT RATIO URINE    Mild persistent asthma without complication    Relevant Medications    montelukast (SINGULAIR) 10 MG Tab    Seasonal allergic rhinitis due to pollen     Lungs are also seen bilaterally, continue albuterol as needed, Wixela inhaler  Add Singulair, advised to use Zyrtec or Claritin not both.  Consider adding Flonase         Relevant Medications    montelukast (SINGULAIR) 10 MG Tab    ketotifen (ZADITOR) 0.025 % ophthalmic solution    Trigger middle finger of left hand     Trigger finger of third digit left hand, return to care next week for a steroid injection          Other Visit Diagnoses       Need for hepatitis C screening test        Relevant Orders    HEP C VIRUS ANTIBODY    Encounter for screening mammogram for breast cancer        Relevant Orders    MA-SCREENING MAMMO BILAT W/ IMPLANTS W/CAD             No follow-ups on file.

## 2023-07-08 NOTE — ASSESSMENT & PLAN NOTE
I suggest the patient that I could take over management however she declines and would like to keep seeing her endocrinologist.  Advised her to follow-up at least every 6 months.  Will order baseline labs and advised her to follow-up    Diabetic foot exam at next visit  She would like to hold off on monofilament exam  Consider retinal exam at next visit as well

## 2023-07-13 ENCOUNTER — OFFICE VISIT (OUTPATIENT)
Dept: MEDICAL GROUP | Facility: PHYSICIAN GROUP | Age: 53
End: 2023-07-13
Payer: COMMERCIAL

## 2023-07-13 VITALS
OXYGEN SATURATION: 97 % | SYSTOLIC BLOOD PRESSURE: 142 MMHG | WEIGHT: 138.89 LBS | HEIGHT: 60 IN | DIASTOLIC BLOOD PRESSURE: 76 MMHG | HEART RATE: 94 BPM | BODY MASS INDEX: 27.27 KG/M2 | TEMPERATURE: 97 F | RESPIRATION RATE: 16 BRPM

## 2023-07-13 DIAGNOSIS — M65.332 TRIGGER MIDDLE FINGER OF LEFT HAND: ICD-10-CM

## 2023-07-13 PROCEDURE — 3078F DIAST BP <80 MM HG: CPT | Performed by: STUDENT IN AN ORGANIZED HEALTH CARE EDUCATION/TRAINING PROGRAM

## 2023-07-13 PROCEDURE — 20550 NJX 1 TENDON SHEATH/LIGAMENT: CPT | Performed by: STUDENT IN AN ORGANIZED HEALTH CARE EDUCATION/TRAINING PROGRAM

## 2023-07-13 PROCEDURE — 3077F SYST BP >= 140 MM HG: CPT | Performed by: STUDENT IN AN ORGANIZED HEALTH CARE EDUCATION/TRAINING PROGRAM

## 2023-07-13 RX ORDER — TRIAMCINOLONE ACETONIDE 40 MG/ML
20 INJECTION, SUSPENSION INTRA-ARTICULAR; INTRAMUSCULAR ONCE
Status: COMPLETED | OUTPATIENT
Start: 2023-07-13 | End: 2023-07-13

## 2023-07-13 RX ORDER — LIDOCAINE HYDROCHLORIDE 20 MG/ML
0.25 INJECTION, SOLUTION EPIDURAL; INFILTRATION; INTRACAUDAL; PERINEURAL ONCE
Status: COMPLETED | OUTPATIENT
Start: 2023-07-13 | End: 2023-07-13

## 2023-07-13 RX ADMIN — LIDOCAINE HYDROCHLORIDE 0.25 ML: 20 INJECTION, SOLUTION EPIDURAL; INFILTRATION; INTRACAUDAL; PERINEURAL at 08:30

## 2023-07-13 RX ADMIN — TRIAMCINOLONE ACETONIDE 20 MG: 40 INJECTION, SUSPENSION INTRA-ARTICULAR; INTRAMUSCULAR at 08:32

## 2023-07-13 ASSESSMENT — FIBROSIS 4 INDEX: FIB4 SCORE: 0.91

## 2023-07-13 NOTE — PROGRESS NOTES
HISTORY OF PRESENT ILLNESS: Rebeca is a pleasant 52 y.o. female, established patient who presents today to discuss medical problems as listed below:    #Trigger finger  Patient has been dealing with a trigger finger for the last few months in her left long finger.  She has not tried steroid injections in the past and had just been keeping it in a splint to avoid excessive use.    Current Outpatient Medications Ordered in Epic   Medication Sig Dispense Refill    albuterol 108 (90 Base) MCG/ACT Aero Soln inhalation aerosol INHALE TWO (2) PUFFS BY MOUTH FOUR (4) TIMES DAILY AS NEEDED 8.5 Each 0    TRESIBA FLEXTOUCH 100 UNIT/ML Solution Pen-injector INJECT 15 UNITS UNDER THE SKIN EVERY EVENING. 15 mL 0    montelukast (SINGULAIR) 10 MG Tab Take 1 Tablet by mouth every day. 30 Tablet 2    ketotifen (ZADITOR) 0.025 % ophthalmic solution Administer 1 Drop into both eyes 2 times a day. 10 mL 1    albuterol (PROVENTIL) 2.5mg/3ml Nebu Soln solution for nebulization TAKE 3 ML BY NEBULIZATION EVERY FOUR HOURS AS NEEDED FOR SHORTNESS OF BREATH. 450 mL 1    triamcinolone acetonide (KENALOG) 0.1 % Cream Apply 0.1 g topically.      naproxen (NAPROSYN) 250 MG Tab Take 250 mg by mouth 2 times a day with meals.      WIXELA INHUB 250-50 MCG/ACT AEROSOL POWDER, BREATH ACTIVATED TAKE 1 PUFF BY MOUTH EVERY 12 HOURS 60 Each 6    insulin lispro (HUMALOG KWIKPEN) 100 UNIT/ML Solution Pen-injector injection PEN Inject 1-10 Units under the skin 3 times a day before meals. Unit for every 50 points over 150 and 1 unit per 15 g of carbohydrates consumed.  Maximum 40 units in 24 hours 5 Each 3    ondansetron (ZOFRAN) 4 MG Tab tablet TAKE ONE (1) TABLET BY MOUTH EVERY SIX (6) HOURS AS NEEDED FOR NAUSEA      acetaminophen (TYLENOL) 325 MG Tab Take 1 Tab by mouth every four hours as needed. 30 Tab 0     No current Epic-ordered facility-administered medications on file.       Review of systems:  Per HPI    Past Medical History:   Diagnosis Date     Asthma     Diabetes ([facility])     DKA, type 2, not at goal ([facility]) 12/24/2021     History reviewed. No pertinent surgical history.  Social History     Tobacco Use    Smoking status: Former     Types: Cigarettes    Smokeless tobacco: Never   Vaping Use    Vaping Use: Never used   Substance Use Topics    Alcohol use: Not Currently     Comment: occ    Drug use: Never      History reviewed. No pertinent family history.  Current Outpatient Medications   Medication Sig Dispense Refill    albuterol 108 (90 Base) MCG/ACT Aero Soln inhalation aerosol INHALE TWO (2) PUFFS BY MOUTH FOUR (4) TIMES DAILY AS NEEDED 8.5 Each 0    TRESIBA FLEXTOUCH 100 UNIT/ML Solution Pen-injector INJECT 15 UNITS UNDER THE SKIN EVERY EVENING. 15 mL 0    montelukast (SINGULAIR) 10 MG Tab Take 1 Tablet by mouth every day. 30 Tablet 2    ketotifen (ZADITOR) 0.025 % ophthalmic solution Administer 1 Drop into both eyes 2 times a day. 10 mL 1    albuterol (PROVENTIL) 2.5mg/3ml Nebu Soln solution for nebulization TAKE 3 ML BY NEBULIZATION EVERY FOUR HOURS AS NEEDED FOR SHORTNESS OF BREATH. 450 mL 1    triamcinolone acetonide (KENALOG) 0.1 % Cream Apply 0.1 g topically.      naproxen (NAPROSYN) 250 MG Tab Take 250 mg by mouth 2 times a day with meals.      WIXELA INHUB 250-50 MCG/ACT AEROSOL POWDER, BREATH ACTIVATED TAKE 1 PUFF BY MOUTH EVERY 12 HOURS 60 Each 6    insulin lispro (HUMALOG KWIKPEN) 100 UNIT/ML Solution Pen-injector injection PEN Inject 1-10 Units under the skin 3 times a day before meals. Unit for every 50 points over 150 and 1 unit per 15 g of carbohydrates consumed.  Maximum 40 units in 24 hours 5 Each 3    ondansetron (ZOFRAN) 4 MG Tab tablet TAKE ONE (1) TABLET BY MOUTH EVERY SIX (6) HOURS AS NEEDED FOR NAUSEA      acetaminophen (TYLENOL) 325 MG Tab Take 1 Tab by mouth every four hours as needed. 30 Tab 0     No current facility-administered medications for this visit.       Allergies:  Allergies   Allergen Reactions    Albumin Hives, Itching  "and Swelling     Itchy eyes, throat and face swells up     Eggs Hives, Itching and Swelling     Itchy eyes, throat and face swells up     Fish Allergy Rash and Swelling     Rash    Morphine      \"During surgery morphine put me into a heart block'       Allergies, past medical history, past surgical history, family history, social history reviewed and updated.    Objective:    BP (!) 142/76 (BP Location: Left arm, Patient Position: Sitting, BP Cuff Size: Adult)   Pulse 94   Temp 36.1 °C (97 °F) (Temporal)   Resp 16   Ht 1.524 m (5')   Wt 63 kg (138 lb 14.2 oz)   SpO2 97%   BMI 27.13 kg/m²    Body mass index is 27.13 kg/m².    Physical exam:  General: Normal appearance, no acute distress, not ill-appearing  Musculoskeletal: Left middle finger with contracture with flexion.      Assessment/Plan:    Problem List Items Addressed This Visit       Trigger middle finger of left hand    Relevant Orders    Consent for all Surgical, Special Diagnostic or Therapeutic Procedures        Return in about 3 months (around 10/13/2023).       Soft Tissue Inj - HAND + UE: long finger, L long A1    Date/Time: 7/13/2023 8:20 AM    Performed by: Ab Horton D.O.  Authorized by: Ab Horton D.O.    Indications:  Contracture  Needle Size:  27 G  Condition: trigger finger    Laterality:  Left  Location:  Long finger  Site:  L long A1  Medications comment:  0.25 mL Xylocaine 2%  0.5 mL Kenalog 40 mg per mL (20mg)  Outcome:  Tolerated well, no immediate complications  Procedure discussed: discussed risks, benefits, and alternatives    Consent Given by:  Patient  Timeout: timeout called immediately prior to procedure    Prep: patient was prepped and draped in usual sterile fashion     Area was cleansed with alcohol swabs.  27-gauge needle was inserted into the tendon sheath, patient actively flex finger to confirm needle not in the tendon.  Patient tolerated procedure well felt some relief after the injection.          "

## 2023-07-13 NOTE — PROCEDURES
HISTORY OF PRESENT ILLNESS: Rebeca is a pleasant 52 y.o. female, established patient who presents today to discuss medical problems as listed below:    #Trigger finger  Patient has been dealing with a trigger finger for the last few months in her left long finger.  She has not tried steroid injections in the past and had just been keeping it in a splint to avoid excessive use.    Current Outpatient Medications Ordered in Epic   Medication Sig Dispense Refill    albuterol 108 (90 Base) MCG/ACT Aero Soln inhalation aerosol INHALE TWO (2) PUFFS BY MOUTH FOUR (4) TIMES DAILY AS NEEDED 8.5 Each 0    TRESIBA FLEXTOUCH 100 UNIT/ML Solution Pen-injector INJECT 15 UNITS UNDER THE SKIN EVERY EVENING. 15 mL 0    montelukast (SINGULAIR) 10 MG Tab Take 1 Tablet by mouth every day. 30 Tablet 2    ketotifen (ZADITOR) 0.025 % ophthalmic solution Administer 1 Drop into both eyes 2 times a day. 10 mL 1    albuterol (PROVENTIL) 2.5mg/3ml Nebu Soln solution for nebulization TAKE 3 ML BY NEBULIZATION EVERY FOUR HOURS AS NEEDED FOR SHORTNESS OF BREATH. 450 mL 1    triamcinolone acetonide (KENALOG) 0.1 % Cream Apply 0.1 g topically.      naproxen (NAPROSYN) 250 MG Tab Take 250 mg by mouth 2 times a day with meals.      WIXELA INHUB 250-50 MCG/ACT AEROSOL POWDER, BREATH ACTIVATED TAKE 1 PUFF BY MOUTH EVERY 12 HOURS 60 Each 6    insulin lispro (HUMALOG KWIKPEN) 100 UNIT/ML Solution Pen-injector injection PEN Inject 1-10 Units under the skin 3 times a day before meals. Unit for every 50 points over 150 and 1 unit per 15 g of carbohydrates consumed.  Maximum 40 units in 24 hours 5 Each 3    ondansetron (ZOFRAN) 4 MG Tab tablet TAKE ONE (1) TABLET BY MOUTH EVERY SIX (6) HOURS AS NEEDED FOR NAUSEA      acetaminophen (TYLENOL) 325 MG Tab Take 1 Tab by mouth every four hours as needed. 30 Tab 0     No current Epic-ordered facility-administered medications on file.       Review of systems:  Per HPI    Past Medical History:   Diagnosis Date     Asthma     Diabetes ([facility])     DKA, type 2, not at goal ([facility]) 12/24/2021     History reviewed. No pertinent surgical history.  Social History     Tobacco Use    Smoking status: Former     Types: Cigarettes    Smokeless tobacco: Never   Vaping Use    Vaping Use: Never used   Substance Use Topics    Alcohol use: Not Currently     Comment: occ    Drug use: Never      History reviewed. No pertinent family history.  Current Outpatient Medications   Medication Sig Dispense Refill    albuterol 108 (90 Base) MCG/ACT Aero Soln inhalation aerosol INHALE TWO (2) PUFFS BY MOUTH FOUR (4) TIMES DAILY AS NEEDED 8.5 Each 0    TRESIBA FLEXTOUCH 100 UNIT/ML Solution Pen-injector INJECT 15 UNITS UNDER THE SKIN EVERY EVENING. 15 mL 0    montelukast (SINGULAIR) 10 MG Tab Take 1 Tablet by mouth every day. 30 Tablet 2    ketotifen (ZADITOR) 0.025 % ophthalmic solution Administer 1 Drop into both eyes 2 times a day. 10 mL 1    albuterol (PROVENTIL) 2.5mg/3ml Nebu Soln solution for nebulization TAKE 3 ML BY NEBULIZATION EVERY FOUR HOURS AS NEEDED FOR SHORTNESS OF BREATH. 450 mL 1    triamcinolone acetonide (KENALOG) 0.1 % Cream Apply 0.1 g topically.      naproxen (NAPROSYN) 250 MG Tab Take 250 mg by mouth 2 times a day with meals.      WIXELA INHUB 250-50 MCG/ACT AEROSOL POWDER, BREATH ACTIVATED TAKE 1 PUFF BY MOUTH EVERY 12 HOURS 60 Each 6    insulin lispro (HUMALOG KWIKPEN) 100 UNIT/ML Solution Pen-injector injection PEN Inject 1-10 Units under the skin 3 times a day before meals. Unit for every 50 points over 150 and 1 unit per 15 g of carbohydrates consumed.  Maximum 40 units in 24 hours 5 Each 3    ondansetron (ZOFRAN) 4 MG Tab tablet TAKE ONE (1) TABLET BY MOUTH EVERY SIX (6) HOURS AS NEEDED FOR NAUSEA      acetaminophen (TYLENOL) 325 MG Tab Take 1 Tab by mouth every four hours as needed. 30 Tab 0     No current facility-administered medications for this visit.       Allergies:  Allergies   Allergen Reactions    Albumin Hives, Itching  "and Swelling     Itchy eyes, throat and face swells up     Eggs Hives, Itching and Swelling     Itchy eyes, throat and face swells up     Fish Allergy Rash and Swelling     Rash    Morphine      \"During surgery morphine put me into a heart block'       Allergies, past medical history, past surgical history, family history, social history reviewed and updated.    Objective:    BP (!) 142/76 (BP Location: Left arm, Patient Position: Sitting, BP Cuff Size: Adult)   Pulse 94   Temp 36.1 °C (97 °F) (Temporal)   Resp 16   Ht 1.524 m (5')   Wt 63 kg (138 lb 14.2 oz)   SpO2 97%   BMI 27.13 kg/m²    Body mass index is 27.13 kg/m².    Physical exam:  General: Normal appearance, no acute distress, not ill-appearing  Musculoskeletal: Left middle finger with contracture with flexion.      Assessment/Plan:    Problem List Items Addressed This Visit       Trigger middle finger of left hand    Relevant Orders    Consent for all Surgical, Special Diagnostic or Therapeutic Procedures        Return in about 3 months (around 10/13/2023).       Soft Tissue Inj - HAND + UE: long finger, L long A1    Date/Time: 7/13/2023 8:20 AM    Performed by: Ab Horton D.O.  Authorized by: Ab Horton D.O.    Indications:  Contracture  Needle Size:  27 G  Condition: trigger finger    Laterality:  Left  Location:  Long finger  Site:  L long A1  Medications comment:  0.25 mL Xylocaine 2%  0.5 mL Kenalog 40 mg per mL (20mg)  Outcome:  Tolerated well, no immediate complications  Procedure discussed: discussed risks, benefits, and alternatives    Consent Given by:  Patient  Timeout: timeout called immediately prior to procedure    Prep: patient was prepped and draped in usual sterile fashion     Area was cleansed with alcohol swabs.  27-gauge needle was inserted into the tendon sheath, patient actively flex finger to confirm needle not in the tendon.  Patient tolerated procedure well felt some relief after the injection.          "

## 2023-07-29 DIAGNOSIS — J30.1 SEASONAL ALLERGIC RHINITIS DUE TO POLLEN: ICD-10-CM

## 2023-07-31 RX ORDER — MONTELUKAST SODIUM 10 MG/1
10 TABLET ORAL DAILY
Qty: 30 TABLET | Refills: 2 | Status: SHIPPED | OUTPATIENT
Start: 2023-07-31 | End: 2023-11-02

## 2023-08-18 RX ORDER — ALBUTEROL SULFATE 90 UG/1
2 AEROSOL, METERED RESPIRATORY (INHALATION) EVERY 4 HOURS PRN
Qty: 18 EACH | Refills: 3 | Status: SHIPPED | OUTPATIENT
Start: 2023-08-18 | End: 2024-03-18 | Stop reason: SDUPTHER

## 2023-08-18 NOTE — TELEPHONE ENCOUNTER
Received request via: Patient    Was the patient seen in the last year in this department? Yes    Does the patient have an active prescription (recently filled or refills available) for medication(s) requested? No    Does the patient have snf Plus and need 100 day supply (blood pressure, diabetes and cholesterol meds only)? Patient does not have SCP      Pt requesting refills to be included with prescription.

## 2023-08-24 ENCOUNTER — PATIENT MESSAGE (OUTPATIENT)
Dept: HEALTH INFORMATION MANAGEMENT | Facility: OTHER | Age: 53
End: 2023-08-24

## 2023-11-02 DIAGNOSIS — J30.1 SEASONAL ALLERGIC RHINITIS DUE TO POLLEN: ICD-10-CM

## 2023-11-02 RX ORDER — MONTELUKAST SODIUM 10 MG/1
10 TABLET ORAL DAILY
Qty: 90 TABLET | Refills: 1 | Status: SHIPPED | OUTPATIENT
Start: 2023-11-02

## 2024-03-12 DIAGNOSIS — J30.1 SEASONAL ALLERGIC RHINITIS DUE TO POLLEN: ICD-10-CM

## 2024-03-12 RX ORDER — KETOTIFEN FUMARATE 0.25 MG/ML
1 SOLUTION/ DROPS OPHTHALMIC 2 TIMES DAILY
Qty: 10 ML | Refills: 1 | Status: SHIPPED | OUTPATIENT
Start: 2024-03-12

## 2024-03-12 NOTE — TELEPHONE ENCOUNTER
Received request via: Pharmacy    Was the patient seen in the last year in this department? Yes    Does the patient have an active prescription (recently filled or refills available) for medication(s) requested? No    Pharmacy Name: [facility] pharmacy     Does the patient have alf Plus and need 100 day supply (blood pressure, diabetes and cholesterol meds only)? Patient does not have SCP

## 2024-03-18 NOTE — TELEPHONE ENCOUNTER
Received request via: Patient    Was the patient seen in the last year in this department? Yes    Does the patient have an active prescription (recently filled or refills available) for medication(s) requested? No    Pharmacy Name: [facility] pharmacy     Does the patient have longterm Plus and need 100 day supply (blood pressure, diabetes and cholesterol meds only)? Patient does not have SCP

## 2024-03-19 RX ORDER — ALBUTEROL SULFATE 90 UG/1
2 AEROSOL, METERED RESPIRATORY (INHALATION) EVERY 4 HOURS PRN
Qty: 18 EACH | Refills: 3 | Status: SHIPPED | OUTPATIENT
Start: 2024-03-19

## 2024-04-25 DIAGNOSIS — J30.1 SEASONAL ALLERGIC RHINITIS DUE TO POLLEN: ICD-10-CM

## 2024-04-25 RX ORDER — MONTELUKAST SODIUM 10 MG/1
10 TABLET ORAL DAILY
Qty: 90 TABLET | Refills: 1 | Status: SHIPPED | OUTPATIENT
Start: 2024-04-25

## 2024-04-25 NOTE — TELEPHONE ENCOUNTER
Received request via: Patient    Was the patient seen in the last year in this department? Yes    Does the patient have an active prescription (recently filled or refills available) for medication(s) requested? No    [facility]    Does the patient have snf Plus and need 100 day supply (blood pressure, diabetes and cholesterol meds only)? Patient does not have SCP

## 2024-08-12 DIAGNOSIS — J30.1 SEASONAL ALLERGIC RHINITIS DUE TO POLLEN: ICD-10-CM

## 2024-08-12 NOTE — TELEPHONE ENCOUNTER
Received request via: Pharmacy    Was the patient seen in the last year in this department? Yes    Does the patient have an active prescription (recently filled or refills available) for medication(s) requested? No    [facility]     Does the patient have shelter Plus and need 100-day supply? (This applies to ALL medications) Patient does not have SCP

## 2024-08-13 RX ORDER — MONTELUKAST SODIUM 10 MG/1
10 TABLET ORAL DAILY
Qty: 90 TABLET | Refills: 1 | Status: SHIPPED | OUTPATIENT
Start: 2024-08-13

## 2024-08-13 RX ORDER — ALBUTEROL SULFATE 90 UG/1
2 AEROSOL, METERED RESPIRATORY (INHALATION) EVERY 4 HOURS PRN
Qty: 8.5 EACH | Refills: 3 | Status: SHIPPED | OUTPATIENT
Start: 2024-08-13

## 2024-10-10 ENCOUNTER — PATIENT MESSAGE (OUTPATIENT)
Dept: HEALTH INFORMATION MANAGEMENT | Facility: OTHER | Age: 54
End: 2024-10-10

## 2024-12-17 ENCOUNTER — TELEPHONE (OUTPATIENT)
Dept: MEDICAL GROUP | Facility: PHYSICIAN GROUP | Age: 54
End: 2024-12-17
Payer: COMMERCIAL

## 2024-12-17 NOTE — TELEPHONE ENCOUNTER
Left message for patient in regards to voicemail that was left. Informed patient that if she is requesting antibiotics this does require an appointment. Left scheduling number [phone] to schedule or advised if she would not like to come in she may go to the nearest urgent care closest to her.

## 2025-01-21 RX ORDER — ALBUTEROL SULFATE 90 UG/1
2 INHALANT RESPIRATORY (INHALATION) EVERY 4 HOURS PRN
Qty: 8.5 EACH | Refills: 3 | Status: SHIPPED | OUTPATIENT
Start: 2025-01-21

## 2025-01-21 NOTE — TELEPHONE ENCOUNTER
Received request via: Patient    Was the patient seen in the last year in this department? No    Does the patient have an active prescription (recently filled or refills available) for medication(s) requested? No    Pharmacy Name: Pharmacy:   [facility]/Pharmacy #[id number] - [place], [place] - [address]     Does the patient have penitentiary Plus and need 100-day supply? (This applies to ALL medications) Patient does not have SCP

## 2025-05-11 DIAGNOSIS — J30.1 SEASONAL ALLERGIC RHINITIS DUE TO POLLEN: ICD-10-CM

## 2025-05-12 RX ORDER — MONTELUKAST SODIUM 10 MG/1
10 TABLET ORAL DAILY
Qty: 90 TABLET | Refills: 1 | Status: SHIPPED | OUTPATIENT
Start: 2025-05-12

## 2025-05-12 NOTE — TELEPHONE ENCOUNTER
Received request via: Patient    Was the patient seen in the last year in this department? No    Does the patient have an active prescription (recently filled or refills available) for medication(s) requested? No    [facility]    Does the patient have penitentiary Plus and need 100-day supply? (This applies to ALL medications) Patient does not have SCP

## 2025-05-20 ENCOUNTER — APPOINTMENT (OUTPATIENT)
Dept: DERMATOLOGY | Facility: IMAGING CENTER | Age: 55
End: 2025-05-20
Payer: COMMERCIAL

## 2025-05-20 ENCOUNTER — APPOINTMENT (OUTPATIENT)
Dept: RADIOLOGY | Facility: MEDICAL CENTER | Age: 55
End: 2025-05-20
Attending: STUDENT IN AN ORGANIZED HEALTH CARE EDUCATION/TRAINING PROGRAM
Payer: COMMERCIAL

## 2025-06-18 RX ORDER — ALBUTEROL SULFATE 90 UG/1
2 INHALANT RESPIRATORY (INHALATION) EVERY 4 HOURS PRN
Qty: 8.5 EACH | Refills: 3 | Status: SHIPPED | OUTPATIENT
Start: 2025-06-18

## 2025-06-18 NOTE — TELEPHONE ENCOUNTER
Received request via: Pharmacy    Was the patient seen in the last year in this department? NO    Does the patient have an active prescription (recently filled or refills available) for medication(s) requested? No    Pharmacy Name:  [facility]/pharmacy #[id number] - [place], [place] - [address] [place] [address]     Does the patient have long term Plus and need 100-day supply? (This applies to ALL medications) Patient does not have SCP

## 2025-08-22 DIAGNOSIS — J30.1 SEASONAL ALLERGIC RHINITIS DUE TO POLLEN: ICD-10-CM

## 2025-08-27 RX ORDER — MONTELUKAST SODIUM 10 MG/1
10 TABLET ORAL DAILY
Qty: 90 TABLET | Refills: 1 | Status: SHIPPED | OUTPATIENT
Start: 2025-08-27